# Patient Record
Sex: FEMALE | Race: WHITE | Employment: PART TIME | URBAN - NONMETROPOLITAN AREA
[De-identification: names, ages, dates, MRNs, and addresses within clinical notes are randomized per-mention and may not be internally consistent; named-entity substitution may affect disease eponyms.]

---

## 2018-06-08 ENCOUNTER — HOSPITAL ENCOUNTER (OUTPATIENT)
Age: 53
Discharge: HOME | End: 2018-06-10
Payer: COMMERCIAL

## 2018-06-08 ENCOUNTER — HOSPITAL ENCOUNTER (OUTPATIENT)
Dept: GENERAL RADIOLOGY | Age: 53
Discharge: HOME | End: 2018-06-10
Payer: COMMERCIAL

## 2018-06-08 DIAGNOSIS — M25.552 LEFT HIP PAIN: ICD-10-CM

## 2018-06-08 PROCEDURE — 73502 X-RAY EXAM HIP UNI 2-3 VIEWS: CPT

## 2020-10-27 RX ORDER — ESTRADIOL 0.1 MG/G
2 CREAM VAGINAL DAILY
COMMUNITY
End: 2020-12-09

## 2020-10-27 RX ORDER — CLOBETASOL PROPIONATE 0.5 MG/G
CREAM TOPICAL 2 TIMES DAILY
COMMUNITY
End: 2020-12-09

## 2020-12-09 ENCOUNTER — OFFICE VISIT (OUTPATIENT)
Dept: OBGYN CLINIC | Age: 55
End: 2020-12-09
Payer: COMMERCIAL

## 2020-12-09 VITALS
DIASTOLIC BLOOD PRESSURE: 58 MMHG | WEIGHT: 135 LBS | HEIGHT: 66 IN | SYSTOLIC BLOOD PRESSURE: 98 MMHG | BODY MASS INDEX: 21.69 KG/M2

## 2020-12-09 PROCEDURE — 99396 PREV VISIT EST AGE 40-64: CPT | Performed by: OBSTETRICS & GYNECOLOGY

## 2020-12-09 RX ORDER — ESTRADIOL 2 MG/1
1 RING VAGINAL
Qty: 1 EACH | Refills: 3 | Status: SHIPPED | OUTPATIENT
Start: 2020-12-09

## 2020-12-09 ASSESSMENT — ENCOUNTER SYMPTOMS
SHORTNESS OF BREATH: 0
DIARRHEA: 0
CONSTIPATION: 0
ABDOMINAL PAIN: 0

## 2020-12-09 NOTE — PROGRESS NOTES
DATE OF VISIT:  20  PATIENT NAME:  Sylwia Hartley     YOB: 1965    63 y.o. Chief Complaint   Patient presents with    Gynecologic Exam     Pap 2015, s/p hysterectomy. Mamm 21 . Colonoscopy . Bone density     Other     Pt. c/o painful intercourse. Vaginal dryness. Pt. has tried estrace and lidocaine oint with intercourse in the past w/o relief. No LMP recorded. Patient has had a hysterectomy. Primary Care Physician: Aj Plaza MD    The patient was seen and examined. She has no chiefcomplaint today and is here for her annual exam.  Her bowels are regular. There are no voiding complaints. She denies any bloating. She denies vaginal discharge and was counseled on STD's and the need for barriercontraception.      HPI : Deidra Rivera is a 63 y.o. female [address] who presents today for her annual.    ______________________________________________________________________    OB History    Para Term  AB Living   1       1     SAB TAB Ectopic Molar Multiple Live Births   1         0      # Outcome Date GA Lbr Edson/2nd Weight Sex Delivery Anes PTL Lv   1 SAB              Past Medical History:   Diagnosis Date    Arthritis     roger thumbs    Lichen sclerosus                                                                    Past Surgical History:   Procedure Laterality Date    APPENDECTOMY      APPENDECTOMY      BLADDER REPAIR      CARPAL TUNNEL RELEASE      right    HYSTERECTOMY      cervix removed    LAPAROSCOPY      endometriosis removal    TONSILLECTOMY       Family History   Problem Relation Age of Onset    Liver Cancer Maternal Grandmother     Diabetes Mother     Hypertension Mother     Stroke Mother      Social History     Socioeconomic History    Marital status:      Spouse name: Not on file    Number of children: Not on file    Years of education: Not on file    Highest education level: Not on file   Occupational medications for this visit. ALLERGIES:  Allergies as of 12/09/2020    (No Known Allergies)           Symptoms of decreased mood absent    **If either question is answered in a  positive fashion then completethe PHQ9 Scoring Evaluation and make the appropriate referral**      Gynecologic History:       No LMP recorded. Patient has had a hysterectomy. Sexually Active: Yes    STD History: No     Permanent Sterilization:Yes hyst   Reversible Birth Control: No        Hormone Replacement Exposure: vaginal estrogen    Genetic Qualified Family History of Breast, Ovarian , Colon or Uterine Cancer:No   If YES see scanned worksheet. Preventative Health Testing:  Colposcopy History:   Date of Last Mammogram: 2019  Date of Last Colonoscopy:   Date of Last Bone Density:      ______________________________________________________________________    REVIEW OF SYSTEMS:       Review of Systems   Constitutional: Negative for chills, fatigue and fever. Respiratory: Negative for shortness of breath. Cardiovascular: Negative for chest pain. Gastrointestinal: Negative for abdominal pain, constipation and diarrhea. Genitourinary: Positive for dyspareunia. Negative for dysuria, enuresis, frequency, menstrual problem, pelvic pain, urgency and vaginal bleeding. Neurological: Negative for dizziness, light-headedness and headaches. PHYSICAL EXAM:    Physical Exam  Constitutional:       Appearance: Normal appearance. Genitourinary:      Pelvic exam was performed with patient in the lithotomy position. Vulva, vagina, cervix, uterus, right adnexa and left adnexa normal.   HENT:      Head: Atraumatic. Mouth/Throat:      Mouth: Mucous membranes are moist.   Eyes:      Extraocular Movements: Extraocular movements intact. Pupils: Pupils are equal, round, and reactive to light. Neck:      Musculoskeletal: Normal range of motion. Cardiovascular:      Rate and Rhythm: Normal rate.    Pulmonary: Effort: Pulmonary effort is normal.   Chest:      Breasts:         Right: Normal.         Left: Normal.   Abdominal:      General: There is no distension. Palpations: Abdomen is soft. Tenderness: There is no abdominal tenderness. Musculoskeletal: Normal range of motion. Neurological:      Mental Status: She is alert and oriented to person, place, and time. Skin:     General: Skin is warm and dry. Psychiatric:         Mood and Affect: Mood normal.         Behavior: Behavior normal.         Thought Content: Thought content normal.         Judgment: Judgment normal.   Chaperone present: chaperone declined. POC Cultures:  No results found for this visit on 12/09/20. ASSESSMENT:      63 y.o. Annual  1. Women's annual routine gynecological examination          Patient Active Problem List    Diagnosis Date Noted    Squamous cell carcinoma of skin of face 01/04/2013          Hereditary Breast, Ovarian, Colon and Uterine Cancer screening Done. Tobacco & Secondary smoke risks reviewed; instructed on cessation and avoidance    PLAN:    Return in about 1 year (around 12/9/2021) for annual.  No orders of the defined types were placed in this encounter. Gave her Dr. Mosqueda info to contact for MLT    Repeat Annual every 1 year  Cervical Cytology Evaluation begins at 24years old. If Negative Cytology, Follow-up screening per current guidelines. Mammograms every 1year. If 37 yo and last mammogram was negative. Calcium and Vitamin D dosing reviewed. Colonoscopy screening reviewed as well as onset for bone density testing. Birth control and barrier recommendationsdiscussed. STD counseling and prevention reviewed. Gardisil counseling completed for all patients 7-35 yo. Routine healthmaintenance per patients PCP.     Electronicallysigned by:  Silvana Garcia DO on 12/09/20

## 2021-03-18 ENCOUNTER — TELEPHONE (OUTPATIENT)
Dept: OBGYN CLINIC | Age: 56
End: 2021-03-18

## 2021-03-18 NOTE — TELEPHONE ENCOUNTER
Patient is scheduled with Dr. Ki Ramirez for Obadiah Shoe treatments and they need a copy of her most recent pap on file. Copy of pap from 10/2015 faxed to his office at [phone].

## 2023-02-27 ENCOUNTER — HOSPITAL ENCOUNTER (OUTPATIENT)
Age: 58
Setting detail: SPECIMEN
Discharge: HOME | End: 2023-02-27

## 2023-02-27 ENCOUNTER — OFFICE VISIT (OUTPATIENT)
Dept: OBGYN CLINIC | Age: 58
End: 2023-02-27
Payer: COMMERCIAL

## 2023-02-27 VITALS
DIASTOLIC BLOOD PRESSURE: 72 MMHG | BODY MASS INDEX: 21.71 KG/M2 | HEIGHT: 66 IN | SYSTOLIC BLOOD PRESSURE: 107 MMHG | WEIGHT: 135.1 LBS

## 2023-02-27 DIAGNOSIS — R68.82 LOW LIBIDO: ICD-10-CM

## 2023-02-27 DIAGNOSIS — Z13.29 SCREENING FOR ENDOCRINE DISORDER: ICD-10-CM

## 2023-02-27 DIAGNOSIS — N95.2 VAGINAL ATROPHY: ICD-10-CM

## 2023-02-27 DIAGNOSIS — Z01.419 WOMEN'S ANNUAL ROUTINE GYNECOLOGICAL EXAMINATION: Primary | ICD-10-CM

## 2023-02-27 LAB
T4 FREE SERPL-MCNC: 1.18 NG/DL (ref 0.93–1.7)
TSH SERPL-ACNC: 1.73 UIU/ML (ref 0.3–5)

## 2023-02-27 PROCEDURE — 99396 PREV VISIT EST AGE 40-64: CPT | Performed by: NURSE PRACTITIONER

## 2023-02-27 RX ORDER — ATORVASTATIN CALCIUM 10 MG/1
TABLET, FILM COATED ORAL
COMMUNITY
Start: 2022-12-05 | End: 2023-11-30

## 2023-02-27 RX ORDER — CALCIUM CARBONATE/VITAMIN D3 600 MG-10
TABLET ORAL
COMMUNITY

## 2023-02-27 RX ORDER — ALPRAZOLAM 0.25 MG/1
TABLET ORAL
COMMUNITY
Start: 2016-08-09

## 2023-02-27 NOTE — PROGRESS NOTES
YEARLY PHYSICAL    Date of service: 2023    Raheel Campbell  Is a 61 y.o. female    PT's PCP is: Sinai Zambrano MD     : 1965                                         Chaperone for Intimate Exam  Chaperone was offered as part of the rooming process. Patient declined and agrees to continue with exam without a chaperone. Chaperone: n/a      Subjective:       No LMP recorded. Patient has had a hysterectomy. Are your menses regular: not applicable    OB History    Para Term  AB Living   1       1     SAB IAB Ectopic Molar Multiple Live Births   1         0      # Outcome Date GA Lbr Edson/2nd Weight Sex Delivery Anes PTL Lv   1 SAB                 Social History     Tobacco Use   Smoking Status Former    Packs/day: 0.50    Years: 16.00    Pack years: 8.00    Types: Cigarettes    Quit date: 1996    Years since quittin.1   Smokeless Tobacco Never        Social History     Substance and Sexual Activity   Alcohol Use Yes    Comment: occ       Family History   Problem Relation Age of Onset    Liver Cancer Maternal Grandmother     Diabetes Mother     Hypertension Mother     Stroke Mother        Any family history of breast or ovarian cancer: No    Any family history of blood clots: No      Allergies: Patient has no known allergies.       Current Outpatient Medications:     [START ON 3/2/2023] Estradiol (VAGIFEM) 10 MCG TABS vaginal tablet, Place 1 tablet vaginally Twice a Week, Disp: 8 tablet, Rfl: 2    atorvastatin (LIPITOR) 10 MG tablet, Take by mouth, Disp: , Rfl:     ALPRAZolam (XANAX) 0.25 MG tablet, Take by mouth., Disp: , Rfl:     Multiple Vitamins-Minerals (VITAMIN D3 COMPLETE PO), Take by mouth, Disp: , Rfl:     calcium carb-cholecalciferol 600-10 MG-MCG TABS per tab, Take by mouth, Disp: , Rfl:     Multiple Vitamin (MULTI-VITAMIN DAILY PO), Take 1 capsule by mouth daily, Disp: , Rfl:     valACYclovir (VALTREX) 500 MG tablet, Take 500 mg by mouth 2 times daily. , Disp: , Rfl:     Social History     Substance and Sexual Activity   Sexual Activity Not Currently       Any bleeding or pain with intercourse: Yes    Last Yearly:  12/9/20    Last pap: 2015     Last HPV: 2015    Last Mammogram: 11/20/21    Last Dexascan 1/2023    Last colorectal screen- 9/27/21    Do you do self breast exams: encouraged     Past Medical History:   Diagnosis Date    Arthritis     orger thumbs    Lichen sclerosus        Past Surgical History:   Procedure Laterality Date    APPENDECTOMY      APPENDECTOMY      BLADDER REPAIR      CARPAL TUNNEL RELEASE      right    HYSTERECTOMY (CERVIX STATUS UNKNOWN)      cervix removed    LAPAROSCOPY      endometriosis removal    TONSILLECTOMY         Family History   Problem Relation Age of Onset    Liver Cancer Maternal Grandmother     Diabetes Mother     Hypertension Mother     Stroke Mother        Chief Complaint   Patient presents with    Annual Exam     Last pap 2015. S/P hysterectomy. Desires TSH checked d/t low libido. PE:  Vital Signs  Blood pressure 107/72, height 5' 6\" (1.676 m), weight 135 lb 1.6 oz (61.3 kg). Estimated body mass index is 21.81 kg/m² as calculated from the following:    Height as of this encounter: 5' 6\" (1.676 m). Weight as of this encounter: 135 lb 1.6 oz (61.3 kg). HPI: Patient presents today for annual exam. Denies breast/pelvic pain. Denies bladder/bowel concerns. Pap not indicated s/p TLH. Mammogram due. Wellness reviewed; follows with PCP. Complains of continued vaginal dryness and low libido. Working with a hormone doctor and TPO antibodies were elevated; would like tyroid testing completed. Review of Systems   Constitutional:  Negative for chills, fatigue and fever. Respiratory:  Negative for chest tightness and shortness of breath. Cardiovascular:  Negative for chest pain. Gastrointestinal:  Negative for abdominal pain, constipation and diarrhea.   Endocrine: Negative.    Genitourinary:  Positive for dyspareunia. Negative for dysuria, enuresis, frequency, menstrual problem, pelvic pain, urgency, vaginal bleeding, vaginal discharge and vaginal pain.   Neurological:  Negative for dizziness, light-headedness and headaches.     Physical Exam  Constitutional:       General: She is not in acute distress.     Appearance: Normal appearance. She is not ill-appearing.   Genitourinary:      Vulva, bladder and urethral meatus normal.      No lesions in the vagina.      Right Labia: No rash or lesions.     Left Labia: No lesions or rash.     Vaginal cuff intact.     No vaginal discharge or erythema.      No vaginal prolapse present.     Severe vaginal atrophy present.       Right Adnexa: not tender and no mass present.     Left Adnexa: not tender and no mass present.     Cervix is absent.      No parametrium nodularity present.     Uterus is absent.      No urethral prolapse, tenderness or mass present.   Breasts:     Right: No mass, nipple discharge, skin change or tenderness.      Left: No mass, nipple discharge, skin change or tenderness.   HENT:      Head: Normocephalic and atraumatic.   Eyes:      Extraocular Movements: Extraocular movements intact.      Pupils: Pupils are equal, round, and reactive to light.   Cardiovascular:      Rate and Rhythm: Normal rate.   Pulmonary:      Effort: Pulmonary effort is normal.   Abdominal:      Palpations: Abdomen is soft.      Tenderness: There is no abdominal tenderness. There is no guarding or rebound.   Musculoskeletal:         General: Normal range of motion.   Neurological:      General: No focal deficit present.      Mental Status: She is alert and oriented to person, place, and time.   Skin:     General: Skin is warm and dry.   Psychiatric:         Mood and Affect: Mood normal.         Behavior: Behavior normal.                           Assessment & Plan  1. Women's annual routine gynecological examination  Repeat  Annual every 1 year  Cervical Cytology Evaluation begins at 24years old. If Negative Cytology, Follow-up screening per current guidelines. Mammograms every 1year. If 37 yo and last mammogram was negative. Routine healthmaintenance per patients PCP. 2. Screening for endocrine disorder  Patient complete jenny testing which showed elevated TPO antibodies   - TSH With Reflex Ft4; Future  - T4, Free; Future  - Thyroid Antibodies; Future    3. Vaginal atrophy  Completes MLT treatment x4. Continues to experience pain with intercourse. Only used Estring x4 weeks. Describe pain with intercouse \"feels like partner is hitting a wall\". Moderate/severe atrophy noted on exam. Patient agreeable to trial of vaginal estrogen. Encouraged compliance long term. 4. Low libido  Discussed possible contributing factors. Patient admits pain with intercourse reduces desires. Also significant other is not fully understanding of pain with intercourse and trying to initiate often which is stressful to her. Recommendations made. Return for yearly.     Electronically Signed by FELIX Lemus NP

## 2023-02-28 LAB
THYROGLOBULIN AB: 17 IU/ML (ref 0–40)
THYROPEROXIDASE AB SERPL IA-ACNC: 172 IU/ML (ref 0–25)

## 2023-03-01 RX ORDER — ESTRADIOL 10 UG/1
10 INSERT VAGINAL
Qty: 8 TABLET | Refills: 2 | Status: SHIPPED | OUTPATIENT
Start: 2023-03-02

## 2023-03-01 ASSESSMENT — ENCOUNTER SYMPTOMS
DIARRHEA: 0
ABDOMINAL PAIN: 0
CHEST TIGHTNESS: 0
SHORTNESS OF BREATH: 0
CONSTIPATION: 0

## 2023-09-14 PROBLEM — A09 INFECTIOUS COLITIS: Status: ACTIVE | Noted: 2023-09-14

## 2023-09-14 PROBLEM — K92.1 HEMATOCHEZIA: Status: ACTIVE | Noted: 2023-09-14

## 2023-09-18 PROBLEM — E78.5 HYPERLIPIDEMIA: Status: ACTIVE | Noted: 2023-09-18

## 2023-09-18 PROBLEM — G47.00 INSOMNIA: Status: ACTIVE | Noted: 2023-09-18

## 2023-09-26 PROBLEM — K51.90 ULCERATIVE COLITIS, CHRONIC (HCC): Status: ACTIVE | Noted: 2023-09-26

## 2023-12-08 PROBLEM — K52.9 COLITIS: Status: ACTIVE | Noted: 2023-09-14
